# Patient Record
Sex: FEMALE | Race: BLACK OR AFRICAN AMERICAN | HISPANIC OR LATINO | Employment: FULL TIME | URBAN - METROPOLITAN AREA
[De-identification: names, ages, dates, MRNs, and addresses within clinical notes are randomized per-mention and may not be internally consistent; named-entity substitution may affect disease eponyms.]

---

## 2021-07-29 ENCOUNTER — TELEPHONE (OUTPATIENT)
Dept: OBSTETRICS AND GYNECOLOGY | Facility: CLINIC | Age: 31
End: 2021-07-29

## 2022-10-21 ENCOUNTER — OFFICE VISIT (OUTPATIENT)
Dept: FAMILY MEDICINE CLINIC | Facility: CLINIC | Age: 32
End: 2022-10-21

## 2022-10-21 VITALS
OXYGEN SATURATION: 99 % | SYSTOLIC BLOOD PRESSURE: 128 MMHG | HEART RATE: 69 BPM | BODY MASS INDEX: 32.27 KG/M2 | HEIGHT: 64 IN | WEIGHT: 189 LBS | DIASTOLIC BLOOD PRESSURE: 64 MMHG

## 2022-10-21 DIAGNOSIS — Z30.016 ENCOUNTER FOR INITIAL PRESCRIPTION OF TRANSDERMAL PATCH HORMONAL CONTRACEPTIVE DEVICE: ICD-10-CM

## 2022-10-21 DIAGNOSIS — F41.9 ANXIETY AND DEPRESSION: Primary | ICD-10-CM

## 2022-10-21 DIAGNOSIS — M54.50 ACUTE MIDLINE LOW BACK PAIN WITHOUT SCIATICA: ICD-10-CM

## 2022-10-21 DIAGNOSIS — F32.A ANXIETY AND DEPRESSION: Primary | ICD-10-CM

## 2022-10-21 LAB
B-HCG UR QL: NEGATIVE
EXPIRATION DATE: NORMAL
INTERNAL NEGATIVE CONTROL: NORMAL
INTERNAL POSITIVE CONTROL: NORMAL
Lab: NORMAL

## 2022-10-21 PROCEDURE — 81025 URINE PREGNANCY TEST: CPT | Performed by: PHYSICIAN ASSISTANT

## 2022-10-21 PROCEDURE — 99204 OFFICE O/P NEW MOD 45 MIN: CPT | Performed by: PHYSICIAN ASSISTANT

## 2022-10-21 RX ORDER — CYCLOBENZAPRINE HCL 10 MG
TABLET ORAL
Qty: 30 TABLET | Refills: 0 | Status: SHIPPED | OUTPATIENT
Start: 2022-10-21 | End: 2023-01-27 | Stop reason: SDUPTHER

## 2022-10-21 RX ORDER — ESCITALOPRAM OXALATE 10 MG/1
10 TABLET ORAL DAILY
Qty: 30 TABLET | Refills: 1 | Status: SHIPPED | OUTPATIENT
Start: 2022-10-21 | End: 2023-01-27 | Stop reason: SDUPTHER

## 2022-10-25 NOTE — ASSESSMENT & PLAN NOTE
Patient provided with prescription for diclofenac and cyclobenzaprine to take as needed.  Advised patient to continue to stretch daily and speak to supervisor about more ergonomically correct working station.  Patient knowledge understanding.

## 2022-10-25 NOTE — PROGRESS NOTES
"Chief Complaint  Establish Care, Back Pain, Contraception, Anxiety, and Depression    Subjective        Kapil Villela presents to [facility]  History of Present Illness  Patient reports today to establish care.  Patient reports being seen most recently urgent care states she has not established with a primary care office since moving here 2 years ago.  Patient reports her most recent PCP was in [place] however no records available.    Patient reports having increased anxiety and depression since moving to [place].  Patient states she has young children and is a single mother.  Patient reports working full-time and states she has been unable to juggle her responsibilities and manage her stress as well as she did previously.  Patient reports feeling like she is unmotivated to complete basic daily tasks.  Reports sleeping often and irritability.  Patient also reports having anxiety when she goes into certain stores states that she has to leave in order to calm down.  Patient denies any suicidal thoughts this date    Patient reports having low back pain and feels like it started with her new workspace.  Reports the pain does not go down her legs.  Reports some relief with stretching    Patient reports wanting to restart birth control.  Reports she has been off of birth control for about a year.  Patient reports using the birth control patch and it worked well for her in the past she would like to restart it today.  Back Pain    Contraception    Anxiety      Her past medical history is significant for depression.   Depression      Objective   Vital Signs:  /64 (BP Location: Left arm, Patient Position: Sitting, Cuff Size: Large Adult)   Pulse 69   Ht 162.6 cm (64\")   Wt 85.7 kg (189 lb)   SpO2 99%   BMI 32.44 kg/m²   Estimated body mass index is 32.44 kg/m² as calculated from the following:    Height as of this encounter: 162.6 cm (64\").    Weight as of this encounter: 85.7 " kg (189 lb).          Physical Exam  Vitals and nursing note reviewed.   Constitutional:       General: She is not in acute distress.     Appearance: Normal appearance.   HENT:      Head: Normocephalic.      Right Ear: Hearing normal.      Left Ear: Hearing normal.   Eyes:      Pupils: Pupils are equal, round, and reactive to light.   Cardiovascular:      Rate and Rhythm: Normal rate and regular rhythm.   Pulmonary:      Effort: Pulmonary effort is normal. No respiratory distress.   Musculoskeletal:         General: Tenderness present.      Comments: Patient with pain upon palpation to bilateral lumbar paraspinous muscles.  Patient was nontender to cervical thoracic and lumbar spine.   Skin:     General: Skin is warm and dry.   Neurological:      Mental Status: She is alert.   Psychiatric:         Mood and Affect: Mood normal.        Result Review :                Assessment and Plan   Diagnoses and all orders for this visit:    1. Anxiety and depression (Primary)  Assessment & Plan:  Patient's depression is recurrent and is mild without psychosis. Their depression is currently active and the condition is newly identified. This will be reassessed in 4 weeks. F/U as described:patient was prescribed an antidepressant medicine.  Discussed signs of possible side effects including increased depression and patient acknowledged understanding advised her to continue medication should they occur.  Patient knowledge understanding.    Orders:  -     escitalopram (Lexapro) 10 MG tablet; Take 1 tablet by mouth Daily. For mood  Dispense: 30 tablet; Refill: 1    2. Encounter for initial prescription of transdermal patch hormonal contraceptive device  Assessment & Plan:  Patient urine pregnancy was negative this date.  Discussed the need to continue to monitor her weight as she is at 189 today.  Patient acknowledged understanding also advised patient on when to use a barrier method she also acknowledged understanding call if any  problems arise.    Orders:  -     POC Pregnancy, Urine  -     norelgestromin-ethinyl estradiol (ORTHO EVRA) 150-35 MCG/24HR; Place 1 patch on the skin as directed by provider 1 (One) Time Per Week. Place 1 patch on skin once weekly for 3 weeks, skip 4th week.  Dispense: 3 patch; Refill: 12    3. Acute midline low back pain without sciatica  Assessment & Plan:  Patient provided with prescription for diclofenac and cyclobenzaprine to take as needed.  Advised patient to continue to stretch daily and speak to supervisor about more ergonomically correct working station.  Patient knowledge understanding.    Orders:  -     cyclobenzaprine (FLEXERIL) 10 MG tablet; Take 1/2 to 1 whole tab po TID as needed for muscle relaxation, caution sedation.  Dispense: 30 tablet; Refill: 0  -     diclofenac (VOLTAREN) 50 MG EC tablet; Take 1 tablet by mouth 2 (Two) Times a Day As Needed (for pain). With food  Dispense: 60 tablet; Refill: 1           Follow Up   No follow-ups on file.  Patient was given instructions and counseling regarding her condition or for health maintenance advice. Please see specific information pulled into the AVS if appropriate.

## 2022-10-25 NOTE — ASSESSMENT & PLAN NOTE
Patient urine pregnancy was negative this date.  Discussed the need to continue to monitor her weight as she is at 189 today.  Patient acknowledged understanding also advised patient on when to use a barrier method she also acknowledged understanding call if any problems arise.

## 2022-10-25 NOTE — ASSESSMENT & PLAN NOTE
Patient's depression is recurrent and is mild without psychosis. Their depression is currently active and the condition is newly identified. This will be reassessed in 4 weeks. F/U as described:patient was prescribed an antidepressant medicine.  Discussed signs of possible side effects including increased depression and patient acknowledged understanding advised her to continue medication should they occur.  Patient knowledge understanding.

## 2023-01-27 DIAGNOSIS — F41.9 ANXIETY AND DEPRESSION: ICD-10-CM

## 2023-01-27 DIAGNOSIS — M54.50 ACUTE MIDLINE LOW BACK PAIN WITHOUT SCIATICA: ICD-10-CM

## 2023-01-27 DIAGNOSIS — F32.A ANXIETY AND DEPRESSION: ICD-10-CM

## 2023-01-27 RX ORDER — ESCITALOPRAM OXALATE 10 MG/1
10 TABLET ORAL DAILY
Qty: 30 TABLET | Refills: 1 | Status: SHIPPED | OUTPATIENT
Start: 2023-01-27

## 2023-01-27 RX ORDER — CYCLOBENZAPRINE HCL 10 MG
TABLET ORAL
Qty: 30 TABLET | Refills: 0 | Status: SHIPPED | OUTPATIENT
Start: 2023-01-27

## 2023-01-27 NOTE — TELEPHONE ENCOUNTER
PATIENT CALLED BACK AND IS USING THE   [facility] [address] [place] [address] CONFIRMED

## 2023-01-27 NOTE — TELEPHONE ENCOUNTER
Caller: Kapil Villela    Relationship: Self    Best call back number:[phone]     Requested Prescriptions:   Requested Prescriptions     Pending Prescriptions Disp Refills   • escitalopram (Lexapro) 10 MG tablet 30 tablet 1     Sig: Take 1 tablet by mouth Daily. For mood   • cyclobenzaprine (FLEXERIL) 10 MG tablet 30 tablet 0     Sig: Take 1/2 to 1 whole tab po TID as needed for muscle relaxation, caution sedation.        Pharmacy where request should be sent: PHARMACY WAS NOT CONFIRMED.  PATIENT WAS DISCONNECTED.    Additional details provided by patient: PATIENT WOULD LIKE TO DISCUSS CHANGE IN BIRTH CONTROL    Does the patient have less than a 3 day supply:  [] Yes  [] No    Would you like a call back once the refill request has been completed: [] Yes [] No    If the office needs to give you a call back, can they leave a voicemail: [] Yes [] No    Liat Booker Rep   01/27/23 10:51 EST

## 2023-05-01 ENCOUNTER — TELEPHONE (OUTPATIENT)
Dept: FAMILY MEDICINE CLINIC | Facility: CLINIC | Age: 33
End: 2023-05-01

## 2023-05-01 NOTE — TELEPHONE ENCOUNTER
Caller: Kapil Villela     Relationship: [unfilled]     Best call back number:[phone]    What is your medical concern? PT CALLED IN REGARDS TO BIRTH CONTROL STATED THAT SHE NO LONGER WANTS TO BE ON PATCH BUT WOULD RATHER TRY INSERT BUT NOT THE ONE IN ARM, ALSO ANXIETY MEDICATION SEEMS TO NOT BE WORKING SOMETIMES. PT ALSO MENTIONED THAT SHE HAS BEEN HAVING SOME STOMACH ISSUES.

## 2023-05-04 ENCOUNTER — OFFICE VISIT (OUTPATIENT)
Dept: FAMILY MEDICINE CLINIC | Facility: CLINIC | Age: 33
End: 2023-05-04
Payer: COMMERCIAL

## 2023-05-04 VITALS
HEIGHT: 64 IN | OXYGEN SATURATION: 99 % | WEIGHT: 201.4 LBS | DIASTOLIC BLOOD PRESSURE: 70 MMHG | BODY MASS INDEX: 34.38 KG/M2 | SYSTOLIC BLOOD PRESSURE: 110 MMHG | HEART RATE: 77 BPM | TEMPERATURE: 97.5 F

## 2023-05-04 DIAGNOSIS — Z30.014 EVALUATION FOR INTRAUTERINE CONTRACEPTION: ICD-10-CM

## 2023-05-04 DIAGNOSIS — R11.2 NAUSEA AND VOMITING, UNSPECIFIED VOMITING TYPE: Primary | ICD-10-CM

## 2023-05-04 DIAGNOSIS — L30.8 OTHER ECZEMA: ICD-10-CM

## 2023-05-04 DIAGNOSIS — F32.A ANXIETY AND DEPRESSION: ICD-10-CM

## 2023-05-04 DIAGNOSIS — R39.15 URINARY URGENCY: ICD-10-CM

## 2023-05-04 DIAGNOSIS — F41.9 ANXIETY AND DEPRESSION: ICD-10-CM

## 2023-05-04 LAB
B-HCG UR QL: NEGATIVE
BILIRUB BLD-MCNC: NEGATIVE MG/DL
CLARITY, POC: CLEAR
COLOR UR: YELLOW
EXPIRATION DATE: ABNORMAL
EXPIRATION DATE: NORMAL
GLUCOSE UR STRIP-MCNC: NEGATIVE MG/DL
INTERNAL NEGATIVE CONTROL: NORMAL
INTERNAL POSITIVE CONTROL: NORMAL
KETONES UR QL: NEGATIVE
LEUKOCYTE EST, POC: NEGATIVE
Lab: ABNORMAL
Lab: NORMAL
NITRITE UR-MCNC: NEGATIVE MG/ML
PH UR: 6.5 [PH] (ref 5–8)
PROT UR STRIP-MCNC: NEGATIVE MG/DL
RBC # UR STRIP: ABNORMAL /UL
SP GR UR: 1.01 (ref 1–1.03)
UROBILINOGEN UR QL: ABNORMAL

## 2023-05-04 RX ORDER — LEVOCETIRIZINE DIHYDROCHLORIDE 5 MG/1
5 TABLET, FILM COATED ORAL EVERY EVENING
Qty: 30 TABLET | Refills: 0 | Status: SHIPPED | OUTPATIENT
Start: 2023-05-04

## 2023-05-04 NOTE — PROGRESS NOTES
"    Office Note     Name: Kapil Villela    : 1990     MRN: [id number]     Chief Complaint  Anxiety and Abdominal Pain    Subjective     History of Present Illness:  Kapil Villela is a 32 y.o. female who presents today for eval of lower abdominal pain, which has been going on for months.  She states that she has nausea when she eats eggs, greasy foods, or anything with red dye.  She states that there are other things that bother her as well, but she knows those things specifically.  She describes it as lower abdominal cramping and hunger pain.  She denies any heartburn or reflux, but she does admit having some \"sulfur burps.\"  She states that she is able to eat eat red sauce like marinara without a problem.    She states that her Lexapro has been doing really well for her, but she does still have some residual anxiety.  She denies any negative side effects of the medication other than some mild diarrhea.  She states that it is not enough that it is worth changing the medication for her.  She would like to continue taking it because it has helped significantly.    She is also interested in alternative birth control.  She is currently using the patches, and she feels like they do not stick properly.  She is not interested in the pills or the implant, but she would be interested in an IUD especially 1 without hormones.    She also mentions that she has had some urinary urgency this morning and she wants to make sure her urine is clear while she is here.    Review of Systems:   Review of Systems   Gastrointestinal: Positive for abdominal pain ( lower abdomen), diarrhea ( just since Lexapro medication), nausea, vomiting and indigestion ( sulfer burps). Negative for constipation and GERD.   Skin: Positive for rash.       Past Medical History: History reviewed. No pertinent past medical history.    Past Surgical History: History reviewed. No pertinent surgical history.    Family History: History reviewed. No pertinent " "family history.    Social History:   Social History     Socioeconomic History   • Marital status: Single   Tobacco Use   • Smoking status: Every Day     Packs/day: 0.25     Years: 15.00     Pack years: 3.75     Types: Cigarettes   • Smokeless tobacco: Never   Vaping Use   • Vaping Use: Never used   Substance and Sexual Activity   • Alcohol use: Yes   • Drug use: Not Currently   • Sexual activity: Yes     Partners: Male       Immunizations:   Immunization History   Administered Date(s) Administered   • DTaP, Unspecified 11/07/1997   • FluLaval/Fluzone >6mos 03/18/2016   • HPV Quadrivalent 04/25/2011, 07/11/2011   • Hep B, Adolescent or Pediatric 11/07/1997   • Influenza, Unspecified 03/18/2016   • MMR 11/07/1997, 12/17/1997   • Meningococcal Polysaccharide 03/04/2008   • OPV 11/07/1997   • Tdap 03/04/2008, 03/25/2016        Medications:     Current Outpatient Medications:   •  cyclobenzaprine (FLEXERIL) 10 MG tablet, Take 1/2 to 1 whole tab po TID as needed for muscle relaxation, caution sedation., Disp: 30 tablet, Rfl: 0  •  diclofenac (VOLTAREN) 50 MG EC tablet, Take 1 tablet by mouth 2 (Two) Times a Day As Needed (for pain). With food, Disp: 60 tablet, Rfl: 1  •  escitalopram (Lexapro) 10 MG tablet, Take 1 tablet by mouth Daily. For mood, Disp: 30 tablet, Rfl: 1  •  Cariprazine HCl (Vraylar) 1.5 MG capsule capsule, Take 1 capsule by mouth Daily for 14 days, THEN 2 capsules Daily for 16 days., Disp: 46 capsule, Rfl: 0  •  levocetirizine (XYZAL) 5 MG tablet, Take 1 tablet by mouth Every Evening., Disp: 30 tablet, Rfl: 0    Allergies:   No Known Allergies    Objective     Vital Signs  /70 (BP Location: Left arm, Patient Position: Sitting, Cuff Size: Adult)   Pulse 77   Temp 97.5 °F (36.4 °C) (Temporal)   Ht 162.6 cm (64\")   Wt 91.4 kg (201 lb 6.4 oz)   SpO2 99%   BMI 34.57 kg/m²   Estimated body mass index is 34.57 kg/m² as calculated from the following:    Height as of this encounter: 162.6 cm (64\").    " Weight as of this encounter: 91.4 kg (201 lb 6.4 oz).      Physical Exam  Constitutional:       General: She is not in acute distress.     Appearance: Normal appearance.   HENT:      Head: Normocephalic and atraumatic.      Right Ear: Tympanic membrane, ear canal and external ear normal.      Left Ear: Tympanic membrane and ear canal normal.      Nose: Nose normal.      Mouth/Throat:      Mouth: Mucous membranes are moist.   Eyes:      Extraocular Movements: Extraocular movements intact.      Conjunctiva/sclera: Conjunctivae normal.      Pupils: Pupils are equal, round, and reactive to light.   Cardiovascular:      Rate and Rhythm: Normal rate and regular rhythm.      Pulses: Normal pulses.      Heart sounds: Normal heart sounds.   Pulmonary:      Effort: Pulmonary effort is normal. No respiratory distress.      Breath sounds: Normal breath sounds.   Abdominal:      General: Bowel sounds are normal. There is no distension.      Palpations: Abdomen is soft. There is no mass.      Tenderness: There is no abdominal tenderness. There is no right CVA tenderness, left CVA tenderness, guarding or rebound.   Skin:     General: Skin is warm.      Findings: Rash ( Dry scaling of arms) present.   Neurological:      General: No focal deficit present.      Mental Status: She is alert and oriented to person, place, and time.      Cranial Nerves: No cranial nerve deficit.   Psychiatric:         Mood and Affect: Mood normal.         Behavior: Behavior normal.         Thought Content: Thought content normal.         Judgment: Judgment normal.        Results:  No results found for this or any previous visit (from the past 24 hour(s)).     Assessment and Plan     Assessment/Plan:  Diagnoses and all orders for this visit:    1. Nausea and vomiting, unspecified vomiting type (Primary)  Assessment & Plan:  I recommend gallbladder ultrasound for further evaluation due to her symptoms.  She is in agreement, so we will  schedule.    Orders:  -     US Gallbladder; Future  -     POC Pregnancy, Urine    2. Anxiety and depression  Assessment & Plan:  Patient's depression is recurrent and without psychosis. Their depression is currently in partial remission and the condition is improving with treatment. This will be reassessed in 4 weeks. F/U as described:She will continue her Lexapro, and we will add Vraylar for additional symptom relief.    Orders:  -     Cariprazine HCl (Vraylar) 1.5 MG capsule capsule; Take 1 capsule by mouth Daily for 14 days, THEN 2 capsules Daily for 16 days.  Dispense: 46 capsule; Refill: 0    3. Urinary urgency  -     POC Urinalysis Dipstick, Automated    4. Other eczema  -     levocetirizine (XYZAL) 5 MG tablet; Take 1 tablet by mouth Every Evening.  Dispense: 30 tablet; Refill: 0    5. Evaluation for intrauterine contraception  Assessment & Plan:  We will schedule with gynecology to discuss placement of IUD.    Orders:  -     Ambulatory Referral to Gynecology      Follow Up  Return in about 4 weeks (around 6/1/2023) for Recheck.    Vandana Dalal PA-C  [facility]

## 2023-05-06 PROBLEM — R11.2 NAUSEA AND VOMITING: Status: ACTIVE | Noted: 2023-05-06

## 2023-05-06 PROBLEM — L30.9 ECZEMA: Status: ACTIVE | Noted: 2023-05-06

## 2023-05-06 PROBLEM — Z30.014 EVALUATION FOR INTRAUTERINE CONTRACEPTION: Status: ACTIVE | Noted: 2023-05-06

## 2023-05-06 PROBLEM — R39.15 URINARY URGENCY: Status: ACTIVE | Noted: 2023-05-06

## 2023-05-06 NOTE — ASSESSMENT & PLAN NOTE
Patient's depression is recurrent and without psychosis. Their depression is currently in partial remission and the condition is improving with treatment. This will be reassessed in 4 weeks. F/U as described:She will continue her Lexapro, and we will add Vraylar for additional symptom relief.

## 2023-05-06 NOTE — ASSESSMENT & PLAN NOTE
I recommend gallbladder ultrasound for further evaluation due to her symptoms.  She is in agreement, so we will schedule.

## 2023-05-08 DIAGNOSIS — F32.A ANXIETY AND DEPRESSION: Primary | ICD-10-CM

## 2023-05-08 DIAGNOSIS — F41.9 ANXIETY AND DEPRESSION: Primary | ICD-10-CM

## 2023-05-09 ENCOUNTER — TELEPHONE (OUTPATIENT)
Dept: FAMILY MEDICINE CLINIC | Facility: CLINIC | Age: 33
End: 2023-05-09

## 2023-05-09 NOTE — TELEPHONE ENCOUNTER
Caller: Kapil Villela    Relationship: Self    Best call back number: [phone]    What is the best time to reach you: ANY    Who are you requesting to speak with (clinical staff, provider,  specific staff member): CLINICAL    What was the call regarding: THE PATIENT STATES THAT [facility] IS NEEDING A PA FOR THE XYZAL.THE PATIENT STATES THAT SHE WOULD LIKE TO PICK THIS MEDICATION UP TODAY.    Do you require a callback: YES, IF NEEDED.    THANK YOU.

## 2023-05-12 ENCOUNTER — TELEPHONE (OUTPATIENT)
Dept: FAMILY MEDICINE CLINIC | Facility: CLINIC | Age: 33
End: 2023-05-12
Payer: COMMERCIAL

## 2023-05-12 DIAGNOSIS — F41.9 ANXIETY AND DEPRESSION: ICD-10-CM

## 2023-05-12 DIAGNOSIS — F32.A ANXIETY AND DEPRESSION: ICD-10-CM

## 2023-05-12 RX ORDER — ESCITALOPRAM OXALATE 10 MG/1
10 TABLET ORAL DAILY
Qty: 30 TABLET | Refills: 1 | Status: SHIPPED | OUTPATIENT
Start: 2023-05-12

## 2023-05-31 DIAGNOSIS — L30.8 OTHER ECZEMA: ICD-10-CM

## 2023-06-02 RX ORDER — LEVOCETIRIZINE DIHYDROCHLORIDE 5 MG/1
5 TABLET, FILM COATED ORAL EVERY EVENING
Qty: 30 TABLET | Refills: 0 | OUTPATIENT
Start: 2023-06-02

## 2023-08-09 DIAGNOSIS — F32.A ANXIETY AND DEPRESSION: ICD-10-CM

## 2023-08-09 DIAGNOSIS — F41.9 ANXIETY AND DEPRESSION: ICD-10-CM

## 2023-08-09 RX ORDER — ESCITALOPRAM OXALATE 10 MG/1
10 TABLET ORAL DAILY
Qty: 30 TABLET | Refills: 1 | Status: SHIPPED | OUTPATIENT
Start: 2023-08-09

## 2023-08-16 ENCOUNTER — TELEPHONE (OUTPATIENT)
Dept: FAMILY MEDICINE CLINIC | Facility: CLINIC | Age: 33
End: 2023-08-16

## 2023-08-16 NOTE — TELEPHONE ENCOUNTER
MEDICATION SENT TO THE WRONG PHARMACY.  PLEASE RESEND TO [facility]    CALL IF NEEDED

## 2023-08-17 DIAGNOSIS — F41.9 ANXIETY AND DEPRESSION: ICD-10-CM

## 2023-08-17 DIAGNOSIS — F32.A ANXIETY AND DEPRESSION: ICD-10-CM

## 2023-08-17 RX ORDER — ESCITALOPRAM OXALATE 10 MG/1
10 TABLET ORAL DAILY
Qty: 30 TABLET | Refills: 1 | Status: SHIPPED | OUTPATIENT
Start: 2023-08-17

## 2023-08-21 NOTE — TELEPHONE ENCOUNTER
Caller: Kapil Villela    Relationship to patient: Self    Best call back number: [phone]     Patient is needing: PATIENT STATED PHARMACY HAS NOT RECEIVED HER REFILL THAT HAD GONE TO THE WRONG PHARMACY.    REQUESTING HER     escitalopram (Lexapro) 10 MG tablet         [id number] - [place], [place] - [address]  - [phone]  - [phone]  [phone]       REQUESTS CALL BACK WHEN PRESCRIPTION HAS BEEN SENT.

## 2023-08-28 ENCOUNTER — TELEPHONE (OUTPATIENT)
Dept: FAMILY MEDICINE CLINIC | Facility: CLINIC | Age: 33
End: 2023-08-28
Payer: COMMERCIAL

## 2023-08-28 NOTE — TELEPHONE ENCOUNTER
PATIENT STATES THAT HER LEXAPRO WAS CALLED INTO THE WRONG PHARMACY AND THEN CALLED IN THE CORRECT PHARMACY BUT NOW HER INSURANCE IS SAYING IT'S BEING FILLED TO SOON. SHE HAS NOT BEEN ABLE TO GET HER MEDICATION. SHE CAN BE REACHED -[phone]

## 2023-11-28 DIAGNOSIS — F32.A ANXIETY AND DEPRESSION: ICD-10-CM

## 2023-11-28 DIAGNOSIS — F41.9 ANXIETY AND DEPRESSION: ICD-10-CM

## 2023-11-29 RX ORDER — ESCITALOPRAM OXALATE 10 MG/1
10 TABLET ORAL DAILY
Qty: 30 TABLET | Refills: 1 | Status: SHIPPED | OUTPATIENT
Start: 2023-11-29

## 2024-09-13 DIAGNOSIS — F41.9 ANXIETY AND DEPRESSION: ICD-10-CM

## 2024-09-13 DIAGNOSIS — F32.A ANXIETY AND DEPRESSION: ICD-10-CM

## 2024-09-18 RX ORDER — ESCITALOPRAM OXALATE 10 MG/1
10 TABLET ORAL DAILY
Qty: 30 TABLET | Refills: 1 | OUTPATIENT
Start: 2024-09-18

## 2024-11-26 ENCOUNTER — PATIENT MESSAGE (OUTPATIENT)
Dept: FAMILY MEDICINE CLINIC | Facility: CLINIC | Age: 34
End: 2024-11-26
Payer: COMMERCIAL

## 2024-11-26 ENCOUNTER — TELEPHONE (OUTPATIENT)
Dept: FAMILY MEDICINE CLINIC | Facility: CLINIC | Age: 34
End: 2024-11-26
Payer: COMMERCIAL

## 2024-11-26 DIAGNOSIS — F32.A ANXIETY AND DEPRESSION: ICD-10-CM

## 2024-11-26 DIAGNOSIS — F41.9 ANXIETY AND DEPRESSION: ICD-10-CM

## 2024-11-26 RX ORDER — ESCITALOPRAM OXALATE 10 MG/1
10 TABLET ORAL DAILY
Qty: 30 TABLET | Refills: 1 | OUTPATIENT
Start: 2024-11-26

## 2024-11-26 NOTE — TELEPHONE ENCOUNTER
Name: Kapil Villela      Relationship: Self      Best Callback Number: [phone] (home)         HUB PROVIDED THE RELAY MESSAGE FROM THE OFFICE      PATIENT: SCHEDULED PER NOTE    ADDITIONAL INFORMATION:

## 2024-12-08 DIAGNOSIS — F32.A ANXIETY AND DEPRESSION: ICD-10-CM

## 2024-12-08 DIAGNOSIS — F41.9 ANXIETY AND DEPRESSION: ICD-10-CM

## 2024-12-20 RX ORDER — ESCITALOPRAM OXALATE 10 MG/1
10 TABLET ORAL DAILY
Qty: 30 TABLET | Refills: 1 | OUTPATIENT
Start: 2024-12-20

## 2024-12-23 ENCOUNTER — TELEPHONE (OUTPATIENT)
Dept: FAMILY MEDICINE CLINIC | Facility: CLINIC | Age: 34
End: 2024-12-23
Payer: COMMERCIAL

## 2024-12-23 NOTE — TELEPHONE ENCOUNTER
Attempted to reach pt LVM for pt to call and get her annual physcial schedule offered pt Kali 10 appts slots